# Patient Record
(demographics unavailable — no encounter records)

---

## 2025-04-21 NOTE — ASSESSMENT
[FreeTextEntry1] : Ms. NAUN MCALLISTER, 74 year old female, never a smoker, w/ hx of HTN, HLD, DM2, who presented to [facility] ED (03/16/24) with c/o cough, congestion and chest pain x 2 weeks. Workup imaging revealed SNEHA nodule.  Now s/p IR biopsy of SNEHA on 5/22/24. Path revealed positive for malignant cells, AdenoCA c/w primary lung origin.  Now s/p R.A. Lt VATS, lingula-sparing lobectomy, SNEHA wedge rxn, MLND on 6/7/24. 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  I have reviewed the patient's medical records and diagnostic images at time of this office consultation and have made the following recommendation: 1. CT chest reviewed and explained to patient, stable scan, I recommended patient to return to office in .... months with CT Chest without contrast.   I, Dr. GUIDRY, AMMY JIMENEZ, personally performed the evaluation and management (E/M) services for this established patient who follow up today with an existing condition.  That E/M includes conducting the examination, assessing all new/exacerbated/existing conditions, and establishing a plan of care.  Today, my ACP, LINDA Greene, was here to observe my evaluation and management services for this existing condition to be followed going forward.

## 2025-04-21 NOTE — CONSULT LETTER
[Dear  ___] : Dear  [unfilled], [Consult Letter:] : I had the pleasure of evaluating your patient, [unfilled]. [Please see my note below.] : Please see my note below. [Consult Closing:] : Thank you very much for allowing me to participate in the care of this patient.  If you have any questions, please do not hesitate to contact me. [Sincerely,] : Sincerely, [FreeTextEntry3] : Yeimi Chun MD Attending Surgeon Division of Thoracic Surgery , [facility] [employer] at [facility]

## 2025-04-21 NOTE — HISTORY OF PRESENT ILLNESS
[FreeTextEntry1] : Ms. NAUN MCALLISTER, 74 year old female, never a smoker, w/ hx of HTN, HLD, DM2, who presented to [facility] ED (03/16/24) with c/o cough, congestion and chest pain x 2 weeks. Workup imaging revealed SNEHA nodule.  CT Chest on 3/16/24: - 1.7 x 1.2 cm left upper lobe apicoposterior segment marginated/spiculated nodule with a faint peripheral groundglass halo and adjacent linear opacities and tree-in-bud nodules  PFTs on 4/1/24: FVC 77%, FEV1 74%, DLCO 86%  PET/CT on 4/1/5/24: - Increased uptake at BILATERAL posterolateral nasopharyngeal regions, exercise and exercise which may represent lymphoid hyperplasia. - 1.7 x 1.5 cm spiculated LEFT apical nodule demonstrates increased uptake (SUV 3.1, image 49)  BWs for infectious are all negative.  Now s/p IR biopsy of SNEHA on 5/22/24. Path revealed positive for malignant cells, AdenoCA c/w primary lung origin.  Now s/p R.A. Lt VATS, lingula-sparing lobectomy, SNEHA wedge rxn, MLND on 6/7/24. 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  CXR 6/24/24: small Lt effusion.  CT Chest on 8/27/24: - post-op changes - ROGER  CT Chest on 4/15/25: -   Pt presents today for 6 months follow up.

## 2025-04-21 NOTE — ASSESSMENT
[FreeTextEntry1] : Ms. NAUN MCALLISTER, 74 year old female, never a smoker, w/ hx of HTN, HLD, DM2, who presented to [facility] ED (03/16/24) with c/o cough, congestion and chest pain x 2 weeks. Workup imaging revealed SNEHA nodule.  Now s/p IR biopsy of SNEHA on 5/22/24. Path revealed positive for malignant cells, AdenoCA c/w primary lung origin.  Now s/p R.A. Lt VATS, lingula-sparing lobectomy, SNEHA wedge rxn, MLND on 6/7/24. 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  I have reviewed the patient's medical records and diagnostic images at time of this office consultation and have made the following recommendation: 1. CT chest reviewed and explained to patient, stable scan, I recommended patient to return to office in .... months with CT Chest without contrast.   I, Dr. GUIDRY, AMMY JIMENEZ, personally performed the evaluation and management (E/M) services for this established patient who follow up today with an existing condition.  That E/M includes conducting the examination, assessing all new/exacerbated/existing conditions, and establishing a plan of care.  Today, my ACP, LINDA Greene, was here to observe my evaluation and management services for this existing condition to be followed going forward.

## 2025-04-21 NOTE — HISTORY OF PRESENT ILLNESS
[FreeTextEntry1] : Ms. NAUN MCALLISTER, 74 year old female, never a smoker, w/ hx of HTN, HLD, DM2, who presented to [facility] ED (03/16/24) with c/o cough, congestion and chest pain x 2 weeks. Workup imaging revealed SNEHA nodule.  CT Chest on 3/16/24: - 1.7 x 1.2 cm left upper lobe apicoposterior segment marginated/spiculated nodule with a faint peripheral groundglass halo and adjacent linear opacities and tree-in-bud nodules  PFTs on 4/1/24: FVC 77%, FEV1 74%, DLCO 86%  PET/CT on 4/1/5/24: - Increased uptake at BILATERAL posterolateral nasopharyngeal regions, exercise and exercise which may represent lymphoid hyperplasia. - 1.7 x 1.5 cm spiculated LEFT apical nodule demonstrates increased uptake (SUV 3.1, image 49)  BWs for infectious are all negative.  Now s/p IR biopsy of SNEHA on 5/22/24. Path revealed positive for malignant cells, AdenoCA c/w primary lung origin.  Now s/p R.A. Lt VATS, lingula-sparing lobectomy, SNEHA wedge rxn, MLND on 6/7/24. 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  CXR 6/24/24: small Lt effusion.  CT Chest on 8/27/24: - post-op changes - ROGER  CT Chest on 4/15/25: -   Pt presents today for 6 months follow up.

## 2025-05-05 NOTE — CONSULT LETTER
[FreeTextEntry3] : Yeimi Chun MD Attending Surgeon Division of Thoracic Surgery , [facility] [employer] [facility] at [facility]

## 2025-05-05 NOTE — ASSESSMENT
[FreeTextEntry1] : Ms. NAUN MCALLISTER, 74 year old female, never a smoker, w/ hx of HTN, HLD, DM2, who presented to [facility] ED (03/16/24) with c/o cough, congestion and chest pain x 2 weeks. Workup imaging revealed SNEHA nodule.  Now s/p IR biopsy of SNEHA on 5/22/24. Path revealed positive for malignant cells, AdenoCA c/w primary lung origin.  Now s/p R.A. Lt VATS, lingula-sparing lobectomy, SNEHA wedge rxn, MLND on 6/7/24. 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  I have reviewed the patient's medical records and diagnostic images at time of this office consultation and have made the following recommendation: 1. CT chest reviewed with patient and her daughter, revealing new small nodule in bilateral lungs of unclear etiology. Plan to repeat CT Chest without contrast in 3 months for re-evaluation. She is agreeable. 2. Additionally, ongoing chest discomfort, instructed patient to follow up with PCP and cards regarding symptoms.   Recommendations reviewed with patient during this office visit, and all questions answered; Patient instructed on the importance of follow up and verbalizes understanding.     I, Dr. GUIDRY, AMMY JIMENEZ, personally performed the evaluation and management (E/M) services for this established patient. That E/M includes conducting the examination, assessing all new/exacerbated conditions, and establishing a new plan of care. Today, my ACP, Van Pereyra NP, was here to observe my evaluation and management services for this new problem/exacerbated condition to be followed going forward.

## 2025-05-05 NOTE — ASSESSMENT
[FreeTextEntry1] : Ms. NAUN MCALLISTER, 74 year old female, never a smoker, w/ hx of HTN, HLD, DM2, who presented to [facility] ED (03/16/24) with c/o cough, congestion and chest pain x 2 weeks. Workup imaging revealed SNEHA nodule.  Now s/p IR biopsy of SNEHA on 5/22/24. Path revealed positive for malignant cells, AdenoCA c/w primary lung origin.  Now s/p R.A. Lt VATS, lingula-sparing lobectomy, SNEHA wedge rxn, MLND on 6/7/24. 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  I have reviewed the patient's medical records and diagnostic images at time of this office consultation and have made the following recommendation: 1. CT chest reviewed with patient and her daughter, revealing new small nodule in bilateral lungs of unclear etiology. Plan to repeat CT Chest without contrast in 3 months for re-evaluation. She is agreeable. 2. Additionally, ongoing chest discomfort, instructed patient to follow up with PCP and cards regarding symptoms.   Recommendations reviewed with patient during this office visit, and all questions answered; Patient instructed on the importance of follow up and verbalizes understanding.     I, Dr. GUIDRY, AMMY JIMENEZ, personally performed the evaluation and management (E/M) services for this established patient. That E/M includes conducting the examination, assessing all new/exacerbated conditions, and establishing a new plan of care. Today, my ACP, Van Pereyra NP, was here to observe my evaluation and management services for this new problem/exacerbated condition to be followed going forward.

## 2025-05-05 NOTE — CONSULT LETTER
[FreeTextEntry3] : Yeimi Chun MD Attending Surgeon Division of Thoracic Surgery , [facility] [employer] at [facility]